# Patient Record
Sex: MALE | Race: OTHER | HISPANIC OR LATINO | URBAN - METROPOLITAN AREA
[De-identification: names, ages, dates, MRNs, and addresses within clinical notes are randomized per-mention and may not be internally consistent; named-entity substitution may affect disease eponyms.]

---

## 2017-02-09 ENCOUNTER — OUTPATIENT (OUTPATIENT)
Dept: OUTPATIENT SERVICES | Facility: HOSPITAL | Age: 57
LOS: 1 days | Discharge: HOME | End: 2017-02-09
Payer: MEDICAID

## 2017-02-09 DIAGNOSIS — R07.9 CHEST PAIN, UNSPECIFIED: ICD-10-CM

## 2017-02-09 DIAGNOSIS — Z90.49 ACQUIRED ABSENCE OF OTHER SPECIFIED PARTS OF DIGESTIVE TRACT: Chronic | ICD-10-CM

## 2017-02-09 DIAGNOSIS — Z98.89 OTHER SPECIFIED POSTPROCEDURAL STATES: Chronic | ICD-10-CM

## 2017-02-09 LAB
BUN SERPL-MCNC: 17 MG/DL — SIGNIFICANT CHANGE UP (ref 7–23)
CALCIUM SERPL-MCNC: 9.2 MG/DL — SIGNIFICANT CHANGE UP (ref 8.4–10.5)
CHLORIDE SERPL-SCNC: 102 MMOL/L — SIGNIFICANT CHANGE UP (ref 98–107)
CO2 SERPL-SCNC: 20 MMOL/L — LOW (ref 22–31)
CREAT SERPL-MCNC: 0.98 MG/DL — SIGNIFICANT CHANGE UP (ref 0.5–1.3)
GLUCOSE SERPL-MCNC: 110 MG/DL — HIGH (ref 70–99)
HBA1C BLD-MCNC: 5.8 % — HIGH (ref 4–5.6)
HCT VFR BLD CALC: 42.6 % — SIGNIFICANT CHANGE UP (ref 39–50)
HGB BLD-MCNC: 14.6 G/DL — SIGNIFICANT CHANGE UP (ref 13–17)
MCHC RBC-ENTMCNC: 31.5 PG — SIGNIFICANT CHANGE UP (ref 27–34)
MCHC RBC-ENTMCNC: 34.3 % — SIGNIFICANT CHANGE UP (ref 32–36)
MCV RBC AUTO: 91.8 FL — SIGNIFICANT CHANGE UP (ref 80–100)
PLATELET # BLD AUTO: 221 K/UL — SIGNIFICANT CHANGE UP (ref 150–400)
PMV BLD: 10.4 FL — SIGNIFICANT CHANGE UP (ref 7–13)
POTASSIUM SERPL-MCNC: 3.9 MMOL/L — SIGNIFICANT CHANGE UP (ref 3.5–5.3)
POTASSIUM SERPL-SCNC: 3.9 MMOL/L — SIGNIFICANT CHANGE UP (ref 3.5–5.3)
RBC # BLD: 4.64 M/UL — SIGNIFICANT CHANGE UP (ref 4.2–5.8)
RBC # FLD: 13.3 % — SIGNIFICANT CHANGE UP (ref 10.3–14.5)
SODIUM SERPL-SCNC: 137 MMOL/L — SIGNIFICANT CHANGE UP (ref 135–145)
WBC # BLD: 7.93 K/UL — SIGNIFICANT CHANGE UP (ref 3.8–10.5)
WBC # FLD AUTO: 7.93 K/UL — SIGNIFICANT CHANGE UP (ref 3.8–10.5)

## 2017-02-09 PROCEDURE — 93010 ELECTROCARDIOGRAM REPORT: CPT

## 2017-02-09 RX ORDER — SODIUM CHLORIDE 9 MG/ML
3 INJECTION INTRAMUSCULAR; INTRAVENOUS; SUBCUTANEOUS EVERY 8 HOURS
Qty: 0 | Refills: 0 | Status: DISCONTINUED | OUTPATIENT
Start: 2017-02-09 | End: 2017-02-24

## 2017-02-09 RX ORDER — TAMSULOSIN HYDROCHLORIDE 0.4 MG/1
1 CAPSULE ORAL
Qty: 0 | Refills: 0 | COMMUNITY

## 2017-02-09 RX ORDER — SODIUM CHLORIDE 9 MG/ML
500 INJECTION INTRAMUSCULAR; INTRAVENOUS; SUBCUTANEOUS
Qty: 0 | Refills: 0 | Status: DISCONTINUED | OUTPATIENT
Start: 2017-02-09 | End: 2017-02-24

## 2017-02-09 RX ORDER — ATENOLOL AND CHLORTHALIDONE 50; 25 MG/1; MG/1
1 TABLET ORAL
Qty: 0 | Refills: 0 | COMMUNITY

## 2017-02-09 RX ADMIN — SODIUM CHLORIDE 100 MILLILITER(S): 9 INJECTION INTRAMUSCULAR; INTRAVENOUS; SUBCUTANEOUS at 14:53

## 2017-02-09 RX ADMIN — SODIUM CHLORIDE 3 MILLILITER(S): 9 INJECTION INTRAMUSCULAR; INTRAVENOUS; SUBCUTANEOUS at 14:27

## 2017-02-09 NOTE — H&P CARDIOLOGY - NEGATIVE NEUROLOGICAL SYMPTOMS
no hemiparesis/no tremors/no focal seizures/no loss of sensation/no facial palsy/no headache/no vertigo/no syncope/no confusion/no paresthesias/no weakness/no transient paralysis/no generalized seizures/no difficulty walking/no loss of consciousness

## 2017-02-09 NOTE — H&P CARDIOLOGY - PMH
Afib  s/p Ablation x 7 years ago  CAD (coronary artery disease)    Erectile dysfunction    Hyperlipidemia    Hypertension

## 2017-02-09 NOTE — H&P CARDIOLOGY - FAMILY HISTORY
Father  Still living? Yes, Estimated age: Age Unknown  Family history of cardiac disorder, Age at diagnosis: Age Unknown     Mother  Still living? Unknown  Family history of cardiac disorder, Age at diagnosis: Age Unknown

## 2017-02-09 NOTE — H&P CARDIOLOGY - RS GEN PE MLT RESP DETAILS PC
clear to auscultation bilaterally/breath sounds equal/airway patent/respirations non-labored/no chest wall tenderness/good air movement

## 2017-02-09 NOTE — H&P CARDIOLOGY - HISTORY OF PRESENT ILLNESS
57 y/o M w/ PMH of Afib s/p Ablation, CAD, Hyperlipidemia and Hypertension presents for cardiac catheretization. 57 y/o M w/ PMH of Afib s/p Ablation, CAD, Hyperlipidemia and Hypertension presents for cardiac catheretization. Pt states that he has been experiencing worsening RUBIN and has started to have chest pain at rest. Pt's chest pain is described as a sharp pain that radiates over his whole chest that lasts for a few seconds. Pt had a stress in 9/2016 which showed a small, moderate defect in inferolateral wall that is predominantly fixed, suggestive of infarction with minimal keysha-infarct ischemia.  The observed defect was less prominent, but still present on prone images for attenuation correction. Pt will have cardiac angiogram with possible intervention to evaluate for CAD.

## 2017-06-18 ENCOUNTER — EMERGENCY (EMERGENCY)
Facility: HOSPITAL | Age: 57
LOS: 1 days | Discharge: HOME | End: 2017-06-18
Attending: EMERGENCY MEDICINE | Admitting: EMERGENCY MEDICINE
Payer: MEDICAID

## 2017-06-18 VITALS
SYSTOLIC BLOOD PRESSURE: 151 MMHG | DIASTOLIC BLOOD PRESSURE: 94 MMHG | OXYGEN SATURATION: 97 % | HEART RATE: 85 BPM | RESPIRATION RATE: 18 BRPM | TEMPERATURE: 98 F

## 2017-06-18 DIAGNOSIS — Z90.49 ACQUIRED ABSENCE OF OTHER SPECIFIED PARTS OF DIGESTIVE TRACT: Chronic | ICD-10-CM

## 2017-06-18 DIAGNOSIS — Z98.89 OTHER SPECIFIED POSTPROCEDURAL STATES: Chronic | ICD-10-CM

## 2017-06-18 LAB
ALBUMIN SERPL ELPH-MCNC: 4.2 G/DL — SIGNIFICANT CHANGE UP (ref 3.3–5)
ALP SERPL-CCNC: 75 U/L — SIGNIFICANT CHANGE UP (ref 40–120)
ALT FLD-CCNC: 29 U/L — SIGNIFICANT CHANGE UP (ref 4–41)
AST SERPL-CCNC: 20 U/L — SIGNIFICANT CHANGE UP (ref 4–40)
BASOPHILS # BLD AUTO: 0.03 K/UL — SIGNIFICANT CHANGE UP (ref 0–0.2)
BASOPHILS NFR BLD AUTO: 0.4 % — SIGNIFICANT CHANGE UP (ref 0–2)
BILIRUB SERPL-MCNC: 0.2 MG/DL — SIGNIFICANT CHANGE UP (ref 0.2–1.2)
BUN SERPL-MCNC: 17 MG/DL — SIGNIFICANT CHANGE UP (ref 7–23)
CALCIUM SERPL-MCNC: 8.9 MG/DL — SIGNIFICANT CHANGE UP (ref 8.4–10.5)
CHLORIDE SERPL-SCNC: 102 MMOL/L — SIGNIFICANT CHANGE UP (ref 98–107)
CK MB BLD-MCNC: 2.1 NG/ML — SIGNIFICANT CHANGE UP (ref 1–6.6)
CK MB BLD-MCNC: 2.62 NG/ML — SIGNIFICANT CHANGE UP (ref 1–6.6)
CK SERPL-CCNC: 174 U/L — SIGNIFICANT CHANGE UP (ref 30–200)
CK SERPL-CCNC: 189 U/L — SIGNIFICANT CHANGE UP (ref 30–200)
CO2 SERPL-SCNC: 21 MMOL/L — LOW (ref 22–31)
CREAT SERPL-MCNC: 1.04 MG/DL — SIGNIFICANT CHANGE UP (ref 0.5–1.3)
EOSINOPHIL # BLD AUTO: 0.28 K/UL — SIGNIFICANT CHANGE UP (ref 0–0.5)
EOSINOPHIL NFR BLD AUTO: 4.1 % — SIGNIFICANT CHANGE UP (ref 0–6)
GLUCOSE SERPL-MCNC: 165 MG/DL — HIGH (ref 70–99)
HBA1C BLD-MCNC: 5.9 % — HIGH (ref 4–5.6)
HCT VFR BLD CALC: 41.8 % — SIGNIFICANT CHANGE UP (ref 39–50)
HGB BLD-MCNC: 14.6 G/DL — SIGNIFICANT CHANGE UP (ref 13–17)
IMM GRANULOCYTES NFR BLD AUTO: 0.4 % — SIGNIFICANT CHANGE UP (ref 0–1.5)
LYMPHOCYTES # BLD AUTO: 1.95 K/UL — SIGNIFICANT CHANGE UP (ref 1–3.3)
LYMPHOCYTES # BLD AUTO: 28.6 % — SIGNIFICANT CHANGE UP (ref 13–44)
MCHC RBC-ENTMCNC: 31.3 PG — SIGNIFICANT CHANGE UP (ref 27–34)
MCHC RBC-ENTMCNC: 34.9 % — SIGNIFICANT CHANGE UP (ref 32–36)
MCV RBC AUTO: 89.5 FL — SIGNIFICANT CHANGE UP (ref 80–100)
MONOCYTES # BLD AUTO: 0.45 K/UL — SIGNIFICANT CHANGE UP (ref 0–0.9)
MONOCYTES NFR BLD AUTO: 6.6 % — SIGNIFICANT CHANGE UP (ref 2–14)
NEUTROPHILS # BLD AUTO: 4.08 K/UL — SIGNIFICANT CHANGE UP (ref 1.8–7.4)
NEUTROPHILS NFR BLD AUTO: 59.9 % — SIGNIFICANT CHANGE UP (ref 43–77)
NT-PROBNP SERPL-SCNC: 29.22 PG/ML — SIGNIFICANT CHANGE UP
PLATELET # BLD AUTO: 200 K/UL — SIGNIFICANT CHANGE UP (ref 150–400)
PMV BLD: 10.4 FL — SIGNIFICANT CHANGE UP (ref 7–13)
POTASSIUM SERPL-MCNC: 3.7 MMOL/L — SIGNIFICANT CHANGE UP (ref 3.5–5.3)
POTASSIUM SERPL-SCNC: 3.7 MMOL/L — SIGNIFICANT CHANGE UP (ref 3.5–5.3)
PROT SERPL-MCNC: 7.4 G/DL — SIGNIFICANT CHANGE UP (ref 6–8.3)
RBC # BLD: 4.67 M/UL — SIGNIFICANT CHANGE UP (ref 4.2–5.8)
RBC # FLD: 12.9 % — SIGNIFICANT CHANGE UP (ref 10.3–14.5)
SODIUM SERPL-SCNC: 139 MMOL/L — SIGNIFICANT CHANGE UP (ref 135–145)
TROPONIN T SERPL-MCNC: < 0.06 NG/ML — SIGNIFICANT CHANGE UP (ref 0–0.06)
TROPONIN T SERPL-MCNC: < 0.06 NG/ML — SIGNIFICANT CHANGE UP (ref 0–0.06)
WBC # BLD: 6.82 K/UL — SIGNIFICANT CHANGE UP (ref 3.8–10.5)
WBC # FLD AUTO: 6.82 K/UL — SIGNIFICANT CHANGE UP (ref 3.8–10.5)

## 2017-06-18 PROCEDURE — 99285 EMERGENCY DEPT VISIT HI MDM: CPT

## 2017-06-18 PROCEDURE — 70450 CT HEAD/BRAIN W/O DYE: CPT | Mod: 26

## 2017-06-18 PROCEDURE — 93010 ELECTROCARDIOGRAM REPORT: CPT | Mod: 59

## 2017-06-18 PROCEDURE — 71275 CT ANGIOGRAPHY CHEST: CPT | Mod: 26

## 2017-06-18 PROCEDURE — 71020: CPT | Mod: 26

## 2017-06-18 RX ORDER — SODIUM CHLORIDE 9 MG/ML
1000 INJECTION INTRAMUSCULAR; INTRAVENOUS; SUBCUTANEOUS ONCE
Qty: 0 | Refills: 0 | Status: COMPLETED | OUTPATIENT
Start: 2017-06-18 | End: 2017-06-18

## 2017-06-18 RX ORDER — ACETAMINOPHEN 500 MG
650 TABLET ORAL ONCE
Qty: 0 | Refills: 0 | Status: COMPLETED | OUTPATIENT
Start: 2017-06-18 | End: 2017-06-18

## 2017-06-18 RX ORDER — ASPIRIN/CALCIUM CARB/MAGNESIUM 324 MG
81 TABLET ORAL DAILY
Qty: 0 | Refills: 0 | Status: DISCONTINUED | OUTPATIENT
Start: 2017-06-19 | End: 2017-06-22

## 2017-06-18 RX ORDER — METOCLOPRAMIDE HCL 10 MG
10 TABLET ORAL ONCE
Qty: 0 | Refills: 0 | Status: COMPLETED | OUTPATIENT
Start: 2017-06-18 | End: 2017-06-18

## 2017-06-18 RX ORDER — TAMSULOSIN HYDROCHLORIDE 0.4 MG/1
0.4 CAPSULE ORAL AT BEDTIME
Qty: 0 | Refills: 0 | Status: DISCONTINUED | OUTPATIENT
Start: 2017-06-18 | End: 2017-06-22

## 2017-06-18 RX ORDER — ISOSORBIDE MONONITRATE 60 MG/1
30 TABLET, EXTENDED RELEASE ORAL DAILY
Qty: 0 | Refills: 0 | Status: DISCONTINUED | OUTPATIENT
Start: 2017-06-18 | End: 2017-06-22

## 2017-06-18 RX ORDER — CHLORTHALIDONE 50 MG
25 TABLET ORAL DAILY
Qty: 0 | Refills: 0 | Status: COMPLETED | OUTPATIENT
Start: 2017-06-18 | End: 2017-06-19

## 2017-06-18 RX ORDER — ACETAMINOPHEN 500 MG
975 TABLET ORAL ONCE
Qty: 0 | Refills: 0 | Status: COMPLETED | OUTPATIENT
Start: 2017-06-18 | End: 2017-06-18

## 2017-06-18 RX ORDER — ASPIRIN/CALCIUM CARB/MAGNESIUM 324 MG
162 TABLET ORAL ONCE
Qty: 0 | Refills: 0 | Status: COMPLETED | OUTPATIENT
Start: 2017-06-18 | End: 2017-06-18

## 2017-06-18 RX ADMIN — Medication 975 MILLIGRAM(S): at 23:04

## 2017-06-18 RX ADMIN — Medication 10 MILLIGRAM(S): at 23:05

## 2017-06-18 RX ADMIN — Medication 162 MILLIGRAM(S): at 10:48

## 2017-06-18 RX ADMIN — SODIUM CHLORIDE 1000 MILLILITER(S): 9 INJECTION INTRAMUSCULAR; INTRAVENOUS; SUBCUTANEOUS at 13:37

## 2017-06-18 RX ADMIN — Medication 650 MILLIGRAM(S): at 15:15

## 2017-06-18 RX ADMIN — TAMSULOSIN HYDROCHLORIDE 0.4 MILLIGRAM(S): 0.4 CAPSULE ORAL at 23:05

## 2017-06-18 RX ADMIN — Medication 650 MILLIGRAM(S): at 16:40

## 2017-06-18 RX ADMIN — Medication 10 MILLIGRAM(S): at 13:37

## 2017-06-18 NOTE — ED ADULT TRIAGE NOTE - CHIEF COMPLAINT QUOTE
Pt c/o 4 days of SOB, pain to bilateral ribs, midsternal discomfort, and heaviness in legs. Denies cough, denies recent travel.

## 2017-06-18 NOTE — ED PROVIDER NOTE - MEDICAL DECISION MAKING DETAILS
56M PMH HTN CAD p/w RUBIN and chest pain associated concerning for ACS vs CHF. Check CBC CMP Cardiac Enzymes and pro-BNP, CXR, reassess. Pending labs will notify Dr. Duarte for CDU vs admit.

## 2017-06-18 NOTE — ED ADULT NURSE NOTE - CHIEF COMPLAINT
The patient is a 56y Male complaining of insulin detemir (LEVEMIR FLEXPEN/FLEXTOUCH) 100 UNIT/ML injection         Last Written Prescription Date: 02/27/17  Last Fill Quantity: 15ml, # refills: 0  [place] [facility] prescribing provider:  10/17/16          BP Readings from Last 3 Encounters:   11/08/16 136/70   10/17/16 146/78   05/05/16 130/78     Lab Results   Component Value Date    MICROL 51 05/05/2016     No results found for: MICROALBUMIN  Creatinine   Date Value Ref Range Status   10/17/2016 1.05 (H) 0.52 - 1.04 mg/dL Final   ]  GFR Estimate   Date Value Ref Range Status   10/17/2016 53 (L) >60 mL/min/1.7m2 Final     Comment:     Non  GFR Calc   05/05/2016 40 (L) >60 mL/min/1.7m2 Final     Comment:     Non  GFR Calc   10/20/2015 60 (L) >60 mL/min/1.7m2 Final     Comment:     Non  GFR Calc     GFR Estimate If Black   Date Value Ref Range Status   10/17/2016 64 >60 mL/min/1.7m2 Final     Comment:      GFR Calc   05/05/2016 49 (L) >60 mL/min/1.7m2 Final     Comment:      GFR Calc   10/20/2015 73 >60 mL/min/1.7m2 Final     Comment:      GFR Calc     Lab Results   Component Value Date    CHOL 139 05/05/2016     Lab Results   Component Value Date    HDL 42 05/05/2016     Lab Results   Component Value Date    LDL 73 05/05/2016     Lab Results   Component Value Date    TRIG 120 05/05/2016     Lab Results   Component Value Date    CHOLHDLRATIO 5.1 01/22/2015     Lab Results   Component Value Date    AST 13 05/05/2016     Lab Results   Component Value Date    ALT 15 05/05/2016     Lab Results   Component Value Date    A1C 7.3 10/17/2016    A1C 7.7 05/05/2016    A1C 9.7 10/20/2015    A1C 9.0 05/04/2015    A1C 14.5 01/22/2015     Potassium   Date Value Ref Range Status   10/17/2016 3.9 3.4 - 5.3 mmol/L Final           [facility]

## 2017-06-18 NOTE — ED PROVIDER NOTE - OBJECTIVE STATEMENT
56M PMH HTN, CAD p/w 4 days of RUBIN associated with chest pain. Pt reports that he is experiencing shortness of breath walking up the stairs and walking long distances associated with b/l rib pain and substernal chest discomfort. Pain and SOB improves with rest. In addition, pt endorses abdominal swelling and LE heaviness worse in AM for 4 days. Also c/o intermittent left sided HA x4 days. Denies fever, chills, abd pain, n/v/d. Pt has been non-compliant with medications for approx 3 weeks    Cards: Felicia

## 2017-06-18 NOTE — ED PROVIDER NOTE - PHYSICAL EXAMINATION
Attending Note: 55 y/o male presents with c/o c/p, SOB, leg pain x 4 days. PMH HTN, CAD. +SOB walking up stairs and walking long distances with b/l rib pain and substernal chest discomfort, both improve with rest. +Abdominal swelling and LE heaviness worse in AM for 4 days. +Intermittent L sided HA x 4 days. Denies fever, chills, abdominal pain, trauma, n/v/d. Non-compliant with Rx~ 3 weeks

## 2017-06-18 NOTE — ED CDU PROVIDER NOTE - MEDICAL DECISION MAKING DETAILS
Pt is a 57 y/o M nonsmoker PMHx HTN, BPH, CAD, s/p appy, s/p ablation for Afib p/w chest pain x 5 days -- with regard to headache, not concerning for SAH or ICH given lack of sudden onset of headache, severity, head trauma, neuro deficits; r/o ACS as recommended by cardiologist Dr. Duarte -- telemetry, repeat cardiac enzyme, stress test

## 2017-06-18 NOTE — ED CDU PROVIDER NOTE - OBJECTIVE STATEMENT
Pt is a 57 y/o M nonsmoker PMHx HTN, BPH, CAD, s/p appy, s/p ablation for Afib p/w chest pain x 5 days.  Pt notes intermittent left sided chest pain described as aching, nonradiating which worsens and is triggered by exertion lasting no more than ~1 minute associated with shortness of breath.  Pain is nonpleuritic, nonpositional.  Pt also notes associated frontal headache, which is mild at 5/10, aching in description, nonradiating.  Denies any associated fevers, chills, nausea, vomiting, lightheadedness, weakness, numbness, visual/auditory disturbances, head trauma, ETOH abuse.

## 2017-06-18 NOTE — ED CDU PROVIDER NOTE - PLAN OF CARE
Rest, drink plenty of fluids. Advance activity as tolerated. Follow up with your PMD and/or cardiologist within 48-72 hours. Show copies of your reports given to you. Recommend Aspirin 81mg over the counter daily until further evaluation.  Take all of your other medications as previously prescribed. Worsening or continued chest pain, shortness of breath, weakness, return to ED.    If you have issues obtaining follow up, please call: [phone] to obtain a doctor or specialist who takes your insurance in your area.

## 2017-06-18 NOTE — ED ADULT NURSE NOTE - OBJECTIVE STATEMENT
pt c/o SOB x 4 days with intermittent chest discomfort. pt denies cp at this time. respirations even and unlabored. bs b/l clear. placed on cm. NSR noted. labs sent. IVSl in place. medicated as ordered. will monitor.

## 2017-06-18 NOTE — ED CDU PROVIDER NOTE - ATTENDING CONTRIBUTION TO CARE
agree with PA note  Pt is a 55 y/o M nonsmoker PMHx HTN, BPH, CAD, s/p appy, s/p ablation for Afib with normal coronaries on cath done in February this year at [facility] presents with RUBIN and headache.  Denies chest pain, fever, cough.  States at times subjective fevers.  Denies neck pain, fever, weakness, numbness    PE: mildly uncomfortable but more from headache (no active chest pain when I was speaking with him); CTAB/L; s1 s2 no m/r/g abd soft/NT/ND ext: no edema Neuro: CNs intact, 5/5 motor UE and LE; sensation intact;    placed in CDU for stress; spoke with cardiologist who would like a CTA

## 2017-06-18 NOTE — ED PROVIDER NOTE - PROGRESS NOTE DETAILS
Mark: Pt c/o headache. Discussed case with jorge Castillo for pt to go to CDU for serial enzymes and stress test. Recommended CT-A however patient clinically without s/s of acute PE to explain SOB, no tachycardia, LE edema. Will admit patient for observation at this time.

## 2017-06-18 NOTE — ED CDU PROVIDER NOTE - PROGRESS NOTE DETAILS
CDU PA Starkey: Pt resting comfortably, NAD. VSS- on tele: NSR at HR 80 bpm. CE#2 negative. Will continue to monitor and observe- stress test in AM.  Pt notes persistent frontal headache. No neuro deficits.  CT head ordered. pt complaint of headache; gradual in onset; intermittent in nature; improved with Tylenol; will get CT head as has been 3-4 days SUMA Bettencourt - pt signed out to me by SUMA leos at the shift change, cp, for stress test tomorrow, pt is c/o HA, Ct head pending, non-focal neuro exam - will continue to monitor. BALDEMAR Bettencourt cdu - pt noted to have rectal temp of 100.5, neurologically intact, no meningeal signs noted. discussed with Dr Mclaughlin - will observe and continue supportive tx , no need to place pt on isolation.Pt will be signed to Baldemar Vaughn. CDU SUMA DIXON: pt in bed sleepig soundly at this time, will continue supportive treatment, will reasses when pt wakes up CDU PA Baseil: Patient rested comfortably overnight. Seen and examined this morning with attending- patient HA improved with reglan- has mild residual headache, mild rated 2/10. CDU PA Baseil: Patient rested comfortably overnight. Seen and examined this morning with attending- patient HA improved with reglan- has mild residual headache, rated 2/10. No neuro complaints or deficits. Pt has persistent RUBIN with minimal exertion. Previous stress abnml with cath showing non obstructive cad. Will give tylenol, hydration, stress. CDU ATTENDING DISCHARGE NOTE: I have personally seen and examined this patient. I have fully participated in the care of this patient. I have reviewed all pertinent clinical information, including history physical exam, plan and the PA's note and agree except as noted.   56F h/o HTN, CAD, afib s/p ablation presents with chest pain, SOB and HA x5d.  States symptoms are worse on exertion, improve w rest.  HA is gradual in onset, frontal.  No associated dizziness, vision change or focal weakness. 9mo ago had a nuc stress that was abnormal and had a cardiac cath that showed nonocclusive CAD, had medical management. In ED CTA chest negative, trop negative, EKG no acute ischemia.  CT head negative, HA improved w raglan.  Overnight, pt had a rectal temp of 100.5, no cough, no neck stiffness, improved w Tylenol.  Able to ambulate independently. Overnight VSS. On exam well appearing, nad, lungs clear, rrr, abd soft, no rash, no edema, 2+ pulses, speech clear, awake and alert, CN II-XII intact, 5/5 motor, sensation intact.  Spot w o/p cardiologist who requested rpt stress. Serial trop negative, stress negative. Regarding low grade fever, CXR clear, no symptoms of URI.  HA improved, no meningismus, no photophobia, unlikely meningitis. Patient is medically stable for discharge home. JACKSON Holder MD

## 2017-06-19 VITALS
HEART RATE: 99 BPM | RESPIRATION RATE: 16 BRPM | TEMPERATURE: 98 F | DIASTOLIC BLOOD PRESSURE: 72 MMHG | SYSTOLIC BLOOD PRESSURE: 110 MMHG | OXYGEN SATURATION: 99 %

## 2017-06-19 PROCEDURE — 78452 HT MUSCLE IMAGE SPECT MULT: CPT | Mod: 26

## 2017-06-19 PROCEDURE — 93016 CV STRESS TEST SUPVJ ONLY: CPT | Mod: GC

## 2017-06-19 PROCEDURE — 93018 CV STRESS TEST I&R ONLY: CPT | Mod: GC

## 2017-06-19 RX ORDER — KETOROLAC TROMETHAMINE 30 MG/ML
30 SYRINGE (ML) INJECTION ONCE
Qty: 0 | Refills: 0 | Status: DISCONTINUED | OUTPATIENT
Start: 2017-06-19 | End: 2017-06-19

## 2017-06-19 RX ORDER — ACETAMINOPHEN 500 MG
650 TABLET ORAL ONCE
Qty: 0 | Refills: 0 | Status: COMPLETED | OUTPATIENT
Start: 2017-06-19 | End: 2017-06-19

## 2017-06-19 RX ORDER — SODIUM CHLORIDE 9 MG/ML
1000 INJECTION INTRAMUSCULAR; INTRAVENOUS; SUBCUTANEOUS ONCE
Qty: 0 | Refills: 0 | Status: COMPLETED | OUTPATIENT
Start: 2017-06-19 | End: 2017-06-19

## 2017-06-19 RX ADMIN — ISOSORBIDE MONONITRATE 30 MILLIGRAM(S): 60 TABLET, EXTENDED RELEASE ORAL at 11:47

## 2017-06-19 RX ADMIN — Medication 81 MILLIGRAM(S): at 11:47

## 2017-06-19 RX ADMIN — Medication 30 MILLIGRAM(S): at 16:04

## 2017-06-19 RX ADMIN — Medication 650 MILLIGRAM(S): at 08:50

## 2017-06-19 RX ADMIN — SODIUM CHLORIDE 1000 MILLILITER(S): 9 INJECTION INTRAMUSCULAR; INTRAVENOUS; SUBCUTANEOUS at 08:50

## 2018-01-07 NOTE — ED CDU PROVIDER NOTE - RESPIRATORY CHEST EXAM
Alert and oriented to person, place, time/situation. normal mood and affect. no apparent risk to self or others. normal

## 2021-05-03 NOTE — ED CDU PROVIDER NOTE - NEUROLOGICAL LEVEL OF CONSCIOUSNESS
follows commands/alert Orbicularis Oris Muscle Flap Text: The defect edges were debeveled with a #15 scalpel blade.  Given that the defect affected the competency of the oral sphincter an obicularis oris muscle flap was deemed most appropriate to restore this competency and normal muscle function.  Using a sterile surgical marker, an appropriate flap was drawn incorporating the defect. The area thus outlined was incised with a #15 scalpel blade.

## 2022-03-11 NOTE — ED ADULT TRIAGE NOTE - PAIN RATING/NUMBER SCALE (0-10): ACTIVITY
Comment: Resolved with LN2, no treatment needed at this time.\\n\\nDecline bx, elect to freeze and recheck\\nDue to pigment and slight atypical appearance, plan to bring patient back in 2 months to make sure lesion is resolved
Detail Level: Simple
Render Risk Assessment In Note?: no
7
Comment: Patient had imaging done recently but not the thoracic spine. Unclear if neuropathy.\\nOffered Gabapentin 10% topical compound however patient is not overly bothered by itching and deferred Rx.
Comment: Recommend patient use [employer] choice BHA Liquid Exfoliant or ZO Oil Control Pads

## 2023-09-26 ENCOUNTER — EMERGENCY (EMERGENCY)
Facility: HOSPITAL | Age: 63
LOS: 1 days | Discharge: HOME | End: 2023-09-26
Attending: EMERGENCY MEDICINE | Admitting: EMERGENCY MEDICINE
Payer: MEDICAID

## 2023-09-26 VITALS
TEMPERATURE: 98 F | DIASTOLIC BLOOD PRESSURE: 85 MMHG | OXYGEN SATURATION: 100 % | RESPIRATION RATE: 17 BRPM | HEART RATE: 87 BPM | SYSTOLIC BLOOD PRESSURE: 139 MMHG

## 2023-09-26 DIAGNOSIS — Z98.89 OTHER SPECIFIED POSTPROCEDURAL STATES: Chronic | ICD-10-CM

## 2023-09-26 DIAGNOSIS — Z90.49 ACQUIRED ABSENCE OF OTHER SPECIFIED PARTS OF DIGESTIVE TRACT: Chronic | ICD-10-CM

## 2023-09-26 LAB
ALBUMIN SERPL ELPH-MCNC: 4.4 G/DL — SIGNIFICANT CHANGE UP (ref 3.3–5)
ALP SERPL-CCNC: 95 U/L — SIGNIFICANT CHANGE UP (ref 40–120)
ALT FLD-CCNC: 58 U/L — HIGH (ref 4–41)
ANION GAP SERPL CALC-SCNC: 13 MMOL/L — SIGNIFICANT CHANGE UP (ref 7–14)
AST SERPL-CCNC: 33 U/L — SIGNIFICANT CHANGE UP (ref 4–40)
BASOPHILS # BLD AUTO: 0.04 K/UL — SIGNIFICANT CHANGE UP (ref 0–0.2)
BASOPHILS NFR BLD AUTO: 0.6 % — SIGNIFICANT CHANGE UP (ref 0–2)
BILIRUB SERPL-MCNC: 0.4 MG/DL — SIGNIFICANT CHANGE UP (ref 0.2–1.2)
BUN SERPL-MCNC: 18 MG/DL — SIGNIFICANT CHANGE UP (ref 7–23)
CALCIUM SERPL-MCNC: 9.6 MG/DL — SIGNIFICANT CHANGE UP (ref 8.4–10.5)
CHLORIDE SERPL-SCNC: 105 MMOL/L — SIGNIFICANT CHANGE UP (ref 98–107)
CO2 SERPL-SCNC: 22 MMOL/L — SIGNIFICANT CHANGE UP (ref 22–31)
CREAT SERPL-MCNC: 1.02 MG/DL — SIGNIFICANT CHANGE UP (ref 0.5–1.3)
EGFR: 84 ML/MIN/1.73M2 — SIGNIFICANT CHANGE UP
EOSINOPHIL # BLD AUTO: 0.2 K/UL — SIGNIFICANT CHANGE UP (ref 0–0.5)
EOSINOPHIL NFR BLD AUTO: 3 % — SIGNIFICANT CHANGE UP (ref 0–6)
GLUCOSE SERPL-MCNC: 140 MG/DL — HIGH (ref 70–99)
HCT VFR BLD CALC: 39.7 % — SIGNIFICANT CHANGE UP (ref 39–50)
HGB BLD-MCNC: 13.5 G/DL — SIGNIFICANT CHANGE UP (ref 13–17)
IANC: 3.67 K/UL — SIGNIFICANT CHANGE UP (ref 1.8–7.4)
IMM GRANULOCYTES NFR BLD AUTO: 0.5 % — SIGNIFICANT CHANGE UP (ref 0–0.9)
LIDOCAIN IGE QN: 21 U/L — SIGNIFICANT CHANGE UP (ref 7–60)
LYMPHOCYTES # BLD AUTO: 2.04 K/UL — SIGNIFICANT CHANGE UP (ref 1–3.3)
LYMPHOCYTES # BLD AUTO: 30.8 % — SIGNIFICANT CHANGE UP (ref 13–44)
MCHC RBC-ENTMCNC: 31.5 PG — SIGNIFICANT CHANGE UP (ref 27–34)
MCHC RBC-ENTMCNC: 34 GM/DL — SIGNIFICANT CHANGE UP (ref 32–36)
MCV RBC AUTO: 92.8 FL — SIGNIFICANT CHANGE UP (ref 80–100)
MONOCYTES # BLD AUTO: 0.64 K/UL — SIGNIFICANT CHANGE UP (ref 0–0.9)
MONOCYTES NFR BLD AUTO: 9.7 % — SIGNIFICANT CHANGE UP (ref 2–14)
NEUTROPHILS # BLD AUTO: 3.67 K/UL — SIGNIFICANT CHANGE UP (ref 1.8–7.4)
NEUTROPHILS NFR BLD AUTO: 55.4 % — SIGNIFICANT CHANGE UP (ref 43–77)
NRBC # BLD: 0 /100 WBCS — SIGNIFICANT CHANGE UP (ref 0–0)
NRBC # FLD: 0 K/UL — SIGNIFICANT CHANGE UP (ref 0–0)
PLATELET # BLD AUTO: 205 K/UL — SIGNIFICANT CHANGE UP (ref 150–400)
POTASSIUM SERPL-MCNC: 4.6 MMOL/L — SIGNIFICANT CHANGE UP (ref 3.5–5.3)
POTASSIUM SERPL-SCNC: 4.6 MMOL/L — SIGNIFICANT CHANGE UP (ref 3.5–5.3)
PROT SERPL-MCNC: 7.2 G/DL — SIGNIFICANT CHANGE UP (ref 6–8.3)
RBC # BLD: 4.28 M/UL — SIGNIFICANT CHANGE UP (ref 4.2–5.8)
RBC # FLD: 12.3 % — SIGNIFICANT CHANGE UP (ref 10.3–14.5)
SODIUM SERPL-SCNC: 140 MMOL/L — SIGNIFICANT CHANGE UP (ref 135–145)
TROPONIN T, HIGH SENSITIVITY RESULT: 10 NG/L — SIGNIFICANT CHANGE UP
TROPONIN T, HIGH SENSITIVITY RESULT: 9 NG/L — SIGNIFICANT CHANGE UP
WBC # BLD: 6.62 K/UL — SIGNIFICANT CHANGE UP (ref 3.8–10.5)
WBC # FLD AUTO: 6.62 K/UL — SIGNIFICANT CHANGE UP (ref 3.8–10.5)

## 2023-09-26 PROCEDURE — G1004: CPT

## 2023-09-26 PROCEDURE — 74177 CT ABD & PELVIS W/CONTRAST: CPT | Mod: 26,ME

## 2023-09-26 PROCEDURE — 71275 CT ANGIOGRAPHY CHEST: CPT | Mod: 26,MA

## 2023-09-26 PROCEDURE — 93010 ELECTROCARDIOGRAM REPORT: CPT

## 2023-09-26 PROCEDURE — 99285 EMERGENCY DEPT VISIT HI MDM: CPT

## 2023-09-26 RX ORDER — SODIUM CHLORIDE 9 MG/ML
1000 INJECTION INTRAMUSCULAR; INTRAVENOUS; SUBCUTANEOUS ONCE
Refills: 0 | Status: COMPLETED | OUTPATIENT
Start: 2023-09-26 | End: 2023-09-26

## 2023-09-26 RX ORDER — MORPHINE SULFATE 50 MG/1
2 CAPSULE, EXTENDED RELEASE ORAL ONCE
Refills: 0 | Status: DISCONTINUED | OUTPATIENT
Start: 2023-09-26 | End: 2023-09-26

## 2023-09-26 RX ORDER — OXYCODONE AND ACETAMINOPHEN 5; 325 MG/1; MG/1
1 TABLET ORAL
Qty: 10 | Refills: 0
Start: 2023-09-26

## 2023-09-26 RX ORDER — OXYCODONE AND ACETAMINOPHEN 5; 325 MG/1; MG/1
1 TABLET ORAL
Qty: 15 | Refills: 0
Start: 2023-09-26

## 2023-09-26 RX ADMIN — SODIUM CHLORIDE 1000 MILLILITER(S): 9 INJECTION INTRAMUSCULAR; INTRAVENOUS; SUBCUTANEOUS at 13:27

## 2023-09-26 RX ADMIN — MORPHINE SULFATE 2 MILLIGRAM(S): 50 CAPSULE, EXTENDED RELEASE ORAL at 12:31

## 2023-09-26 RX ADMIN — MORPHINE SULFATE 2 MILLIGRAM(S): 50 CAPSULE, EXTENDED RELEASE ORAL at 13:28

## 2023-09-26 NOTE — ED PROVIDER NOTE - PATIENT PORTAL LINK FT
You can access the FollowMyHealth Patient Portal offered by [facility] by registering at the following website: [email or web address]. By joining [employer]’s FollowMyHealth portal, you will also be able to view your health information using other applications (apps) compatible with our system.

## 2023-09-26 NOTE — ED ADULT NURSE NOTE - OBJECTIVE STATEMENT
received patient to 28 a for LLQ pain x 3 days, sudden onset, no n/v/d, ambulatory, gait steady. Pt keeps guarding side, appears uncomfortable, medicated per MD orders, RR even and unlabored, left 20g ac placed, labs sent.

## 2023-09-26 NOTE — ED PROVIDER NOTE - NSFOLLOWUPINSTRUCTIONS_ED_ALL_ED_FT
You were seen today because of abdominal pain and hemoptysis. We performed a CT Angiogram to look for a clot in your lungs and a CT of your abdomen and pelvis to see if there is anything causing your abdominal pain. Our testing did not find a pulmonary embolism or any anatomical reason for your pain. A 4.5 cm right renal cyst was noted on your CT of your abdomen and pelvis. You will be receiving a call to set up an urgent appointment with a gastroenterologist to follow your pain.     Abdominal Pain    Many things can cause abdominal pain. Many times, abdominal pain is not caused by a disease and will improve without treatment. Your health care provider will do a physical exam to determine if there is a dangerous cause of your pain; blood tests and imaging may help determine the cause of your pain. However, in many cases, no cause may be found and you may need further testing as an outpatient. Monitor your abdominal pain for any changes.     SEEK IMMEDIATE MEDICAL CARE IF YOU HAVE ANY OF THE FOLLOWING SYMPTOMS: worsening abdominal pain, uncontrollable vomiting, profuse diarrhea, inability to have bowel movements or pass gas, black or bloody stools, fever accompanying chest pain or back pain, or fainting. These symptoms may represent a serious problem that is an emergency. Do not wait to see if the symptoms will go away. Get medical help right away. Call 911 and do not drive yourself to the hospital.

## 2023-09-26 NOTE — ED ADULT TRIAGE NOTE - CHIEF COMPLAINT QUOTE
Pt c/o left upper quadrant abdominal pain that started x3 days ago. Denies nausea, vomiting and diarrhea. Endorsing that the pain feels like it is stabbing him. Abdomen soft and tender on palpation. Pt endorsing frequent bowel movements. Hx: HTN, aspirin, Elqiuis

## 2023-09-26 NOTE — ED PROVIDER NOTE - CLINICAL SUMMARY MEDICAL DECISION MAKING FREE TEXT BOX
62yM PMHx Afib s/p ablation on Afib, CAD, HTN presenting with 3 day h/o LUQ tenderness and 2x episodes of hemoptysis. Physical exam notable for TTP over LUQ and lateral left side. EKG significant for new RBBB that was not there on prior EKG 5 years ago. Obtaining CTA to r/o PE, CTAP to r/o splenic injury/infarct.

## 2023-09-26 NOTE — ED ADULT NURSE NOTE - NSFALLUNIVINTERV_ED_ALL_ED
Bed/Stretcher in lowest position, wheels locked, appropriate side rails in place/Call bell, personal items and telephone in reach/Instruct patient to call for assistance before getting out of bed/chair/stretcher/Non-slip footwear applied when patient is off stretcher/[place] to call system/Physically safe environment - no spills, clutter or unnecessary equipment/Purposeful proactive rounding/Room/bathroom lighting operational, light cord in reach

## 2023-09-26 NOTE — ED PROVIDER NOTE - NS ED ROS FT
REVIEW OF SYSTEMS:    CONSTITUTIONAL: No weakness, fevers or chills  RESPIRATORY: +Hemoptysis, + shortness of breath  GASTROINTESTINAL: + Abdominal pain, + diarrhea  GENITOURINARY: No dysuria, frequency    More ROS in the HPI

## 2023-09-26 NOTE — ED PROVIDER NOTE - NSFOLLOWUPCLINICS_GEN_ALL_ED_FT
Gastroenterology at [facility]  Gastroenterology  [address] [place], [place] [address]  Phone: [phone]  Fax:   Follow Up Time: 1-3 Days

## 2023-09-26 NOTE — ED PROVIDER NOTE - OBJECTIVE STATEMENT
62yM PMHx Afib s/p ablation (on Eliquis), CAD, HLD, HTN, PSHx appendectomy presenting with 3 day h/o LUQ pain. Patient reports that the pain started suddenly and feels like a stabbing sensation with a twisting of the knife. Patient works as a  and does not do any heavy lifting. He also has had 2x episodes of hemoptysis, on the night of 09/25 and 09/24. He has had some associated SOB as well. Patient decided to come in to the ED because his LUQ pain became 10/10 and constant. He has also noticed increased BM frequency and diarrhea for the last 3 days and that his abdomen has become more distended.     Pt denies any dysuria, testicular pain, n/v, recent travel, weight loss, fever, chills

## 2023-09-26 NOTE — ED PROVIDER NOTE - PHYSICAL EXAMINATION
HEAD:  Atraumatic, normocephalic  EYES: EOMI, PERRLA, conjunctiva and sclera clear  NECK: Supple, trachea midline, no JVD  HEART: Regular rate and rhythm, no murmurs, rubs, or gallops  LUNGS: Respirations limited slightly from pain. Lungs clear  ABDOMEN: Distended, TTP over LLQ, acutely in the lateral side under last rib

## 2023-09-26 NOTE — ED PROVIDER NOTE - ATTENDING CONTRIBUTION TO CARE
DR. VELAZQUEZ, ATTENDING MD-  I performed a face to face bedside interview with the patient regarding history of present illness, review of symptoms and past medical history. I completed an independent physical exam.  I have discussed the patient's plan of care with the resident.   Documentation as above in the note.    61 y/o male with h/o afib on eliquis cad htn hld c/o luq pain hemoptysis.  Evaluate for pe bronchitis pna intra-abd infection.  Obtain ekg cbc cmp trop ctpa ct a/p give pain med reassess.

## 2023-09-26 NOTE — ED PROVIDER NOTE - EKG ADDITIONAL INFORMATION FREE TEXT
Regular rate and rhythm, Normal QTc interval, no ST segment elevations, left axis deviation RBBB noted changed from EKG 5 years prior

## 2023-09-26 NOTE — ED PROVIDER NOTE - NSICDXPASTMEDICALHX_GEN_ALL_CORE_FT
PAST MEDICAL HISTORY:  Afib s/p Ablation x 7 years ago    CAD (coronary artery disease)     Erectile dysfunction     Hyperlipidemia     Hypertension

## 2023-09-26 NOTE — ED PROVIDER NOTE - NSICDXFAMILYHX_GEN_ALL_CORE_FT
FAMILY HISTORY:  Father  Still living? Yes, Estimated age: Age Unknown  Family history of cardiac disorder, Age at diagnosis: Age Unknown    Mother  Still living? Unknown  Family history of cardiac disorder, Age at diagnosis: Age Unknown

## 2023-10-03 ENCOUNTER — APPOINTMENT (OUTPATIENT)
Dept: GASTROENTEROLOGY | Facility: CLINIC | Age: 63
End: 2023-10-03
Payer: MEDICAID

## 2023-10-03 VITALS
HEIGHT: 69 IN | SYSTOLIC BLOOD PRESSURE: 126 MMHG | WEIGHT: 269 LBS | DIASTOLIC BLOOD PRESSURE: 80 MMHG | OXYGEN SATURATION: 97 % | HEART RATE: 74 BPM | BODY MASS INDEX: 39.84 KG/M2 | TEMPERATURE: 98.1 F

## 2023-10-03 DIAGNOSIS — Z82.49 FAMILY HISTORY OF ISCHEMIC HEART DISEASE AND OTHER DISEASES OF THE CIRCULATORY SYSTEM: ICD-10-CM

## 2023-10-03 DIAGNOSIS — Z78.9 OTHER SPECIFIED HEALTH STATUS: ICD-10-CM

## 2023-10-03 DIAGNOSIS — R10.12 LEFT UPPER QUADRANT PAIN: ICD-10-CM

## 2023-10-03 DIAGNOSIS — K92.0 HEMATEMESIS: ICD-10-CM

## 2023-10-03 DIAGNOSIS — R10.9 UNSPECIFIED ABDOMINAL PAIN: ICD-10-CM

## 2023-10-03 DIAGNOSIS — Z86.79 PERSONAL HISTORY OF OTHER DISEASES OF THE CIRCULATORY SYSTEM: ICD-10-CM

## 2023-10-03 DIAGNOSIS — Z86.39 PERSONAL HISTORY OF OTHER ENDOCRINE, NUTRITIONAL AND METABOLIC DISEASE: ICD-10-CM

## 2023-10-03 PROCEDURE — 99204 OFFICE O/P NEW MOD 45 MIN: CPT

## 2023-10-03 RX ORDER — APIXABAN 5 MG/1
5 TABLET, FILM COATED ORAL
Refills: 0 | Status: ACTIVE | COMMUNITY

## 2023-10-03 RX ORDER — ASPIRIN 81 MG
81 TABLET, DELAYED RELEASE (ENTERIC COATED) ORAL
Refills: 0 | Status: ACTIVE | COMMUNITY

## 2023-10-03 RX ORDER — AMLODIPINE BESYLATE 2.5 MG/1
2.5 TABLET ORAL
Refills: 0 | Status: ACTIVE | COMMUNITY

## 2023-10-03 RX ORDER — FAMOTIDINE 40 MG/1
40 TABLET, FILM COATED ORAL
Qty: 30 | Refills: 3 | Status: ACTIVE | COMMUNITY
Start: 2023-10-03 | End: 1900-01-01

## 2023-10-03 RX ORDER — ATENOLOL 25 MG/1
25 TABLET ORAL
Refills: 0 | Status: ACTIVE | COMMUNITY

## 2023-10-03 RX ORDER — PANTOPRAZOLE 40 MG/1
40 TABLET, DELAYED RELEASE ORAL DAILY
Qty: 30 | Refills: 3 | Status: ACTIVE | COMMUNITY
Start: 2023-10-03 | End: 1900-01-01

## 2023-10-03 RX ORDER — ATORVASTATIN CALCIUM 40 MG/1
40 TABLET, FILM COATED ORAL
Refills: 0 | Status: ACTIVE | COMMUNITY

## 2023-10-12 ENCOUNTER — APPOINTMENT (OUTPATIENT)
Dept: GASTROENTEROLOGY | Facility: AMBULATORY MEDICAL SERVICES | Age: 63
End: 2023-10-12

## 2024-01-29 NOTE — ED PROVIDER NOTE - NSICDXPASTSURGICALHX_GEN_ALL_CORE_FT
Moderate protein-calorie malnutrition
PAST SURGICAL HISTORY:  H/O prior ablation treatment     S/P appendectomy

## 2024-08-30 NOTE — ED ADULT TRIAGE NOTE - INTERNATIONAL TRAVEL
Health Maintenance       Diabetes Eye Exam (Yearly)  Scheduled for 9/23/2024    COVID-19 Vaccine (1 - 2023-24 season)  Never done    Diabetes Foot Exam (Yearly)  Overdue since 1/12/2024    Colorectal Cancer Risk - Colonoscopy (Yearly)  Due since 8/1/2024           Following review of the above:  Patient is not  due    Note: Refer to final orders and clinician documentation.       No